# Patient Record
Sex: FEMALE | Race: BLACK OR AFRICAN AMERICAN | Employment: UNEMPLOYED | URBAN - METROPOLITAN AREA
[De-identification: names, ages, dates, MRNs, and addresses within clinical notes are randomized per-mention and may not be internally consistent; named-entity substitution may affect disease eponyms.]

---

## 2018-10-16 ENCOUNTER — APPOINTMENT (OUTPATIENT)
Dept: GENERAL RADIOLOGY | Facility: HOSPITAL | Age: 48
End: 2018-10-16
Attending: EMERGENCY MEDICINE
Payer: MEDICAID

## 2018-10-16 ENCOUNTER — APPOINTMENT (OUTPATIENT)
Dept: ULTRASOUND IMAGING | Facility: HOSPITAL | Age: 48
End: 2018-10-16
Attending: EMERGENCY MEDICINE
Payer: MEDICAID

## 2018-10-16 ENCOUNTER — APPOINTMENT (OUTPATIENT)
Dept: CT IMAGING | Facility: HOSPITAL | Age: 48
End: 2018-10-16
Attending: EMERGENCY MEDICINE
Payer: MEDICAID

## 2018-10-16 ENCOUNTER — HOSPITAL ENCOUNTER (OUTPATIENT)
Facility: HOSPITAL | Age: 48
Setting detail: OBSERVATION
Discharge: HOME | End: 2018-10-18
Attending: EMERGENCY MEDICINE | Admitting: HOSPITALIST
Payer: MEDICAID

## 2018-10-16 DIAGNOSIS — R07.9 CHEST PAIN OF UNCERTAIN ETIOLOGY: Primary | ICD-10-CM

## 2018-10-16 DIAGNOSIS — R06.00 DYSPNEA, UNSPECIFIED TYPE: ICD-10-CM

## 2018-10-16 DIAGNOSIS — D64.9 SYMPTOMATIC ANEMIA: ICD-10-CM

## 2018-10-16 PROCEDURE — 71275 CT ANGIOGRAPHY CHEST: CPT | Performed by: EMERGENCY MEDICINE

## 2018-10-16 PROCEDURE — 93970 EXTREMITY STUDY: CPT | Performed by: EMERGENCY MEDICINE

## 2018-10-16 PROCEDURE — 99220 INITIAL OBSERVATION CARE,LEVL III: CPT | Performed by: INTERNAL MEDICINE

## 2018-10-16 PROCEDURE — 71046 X-RAY EXAM CHEST 2 VIEWS: CPT | Performed by: EMERGENCY MEDICINE

## 2018-10-16 RX ORDER — TRIAMTERENE AND HYDROCHLOROTHIAZIDE 37.5; 25 MG/1; MG/1
1 CAPSULE ORAL EVERY MORNING
Status: DISCONTINUED | OUTPATIENT
Start: 2018-10-17 | End: 2018-10-18

## 2018-10-16 RX ORDER — ACETAMINOPHEN 325 MG/1
650 TABLET ORAL EVERY 6 HOURS PRN
Status: DISCONTINUED | OUTPATIENT
Start: 2018-10-16 | End: 2018-10-18

## 2018-10-16 RX ORDER — POTASSIUM CHLORIDE 20 MEQ/1
40 TABLET, EXTENDED RELEASE ORAL ONCE
Status: COMPLETED | OUTPATIENT
Start: 2018-10-16 | End: 2018-10-17

## 2018-10-16 RX ORDER — TRIAMTERENE AND HYDROCHLOROTHIAZIDE 37.5; 25 MG/1; MG/1
1 CAPSULE ORAL EVERY MORNING
COMMUNITY

## 2018-10-16 RX ORDER — MELATONIN
325 2 TIMES DAILY WITH MEALS
Status: DISCONTINUED | OUTPATIENT
Start: 2018-10-17 | End: 2018-10-17

## 2018-10-16 RX ORDER — IPRATROPIUM BROMIDE AND ALBUTEROL SULFATE 2.5; .5 MG/3ML; MG/3ML
3 SOLUTION RESPIRATORY (INHALATION) EVERY 4 HOURS PRN
Status: DISCONTINUED | OUTPATIENT
Start: 2018-10-16 | End: 2018-10-18

## 2018-10-16 RX ORDER — ASPIRIN 81 MG/1
324 TABLET, CHEWABLE ORAL ONCE
Status: COMPLETED | OUTPATIENT
Start: 2018-10-16 | End: 2018-10-16

## 2018-10-16 RX ORDER — KETOROLAC TROMETHAMINE 30 MG/ML
30 INJECTION, SOLUTION INTRAMUSCULAR; INTRAVENOUS ONCE
Status: COMPLETED | OUTPATIENT
Start: 2018-10-16 | End: 2018-10-16

## 2018-10-16 RX ORDER — LOSARTAN POTASSIUM 50 MG/1
50 TABLET ORAL DAILY
Status: DISCONTINUED | OUTPATIENT
Start: 2018-10-17 | End: 2018-10-18

## 2018-10-16 RX ORDER — ONDANSETRON 2 MG/ML
4 INJECTION INTRAMUSCULAR; INTRAVENOUS EVERY 4 HOURS PRN
Status: DISCONTINUED | OUTPATIENT
Start: 2018-10-16 | End: 2018-10-18

## 2018-10-17 ENCOUNTER — APPOINTMENT (OUTPATIENT)
Dept: ULTRASOUND IMAGING | Facility: HOSPITAL | Age: 48
End: 2018-10-17
Attending: INTERNAL MEDICINE
Payer: MEDICAID

## 2018-10-17 ENCOUNTER — APPOINTMENT (OUTPATIENT)
Dept: CV DIAGNOSTICS | Facility: HOSPITAL | Age: 48
End: 2018-10-17
Attending: INTERNAL MEDICINE
Payer: MEDICAID

## 2018-10-17 PROCEDURE — 30283B1 TRANSFUSION OF NONAUTOLOGOUS 4-FACTOR PROTHROMBIN COMPLEX CONCENTRATE INTO VEIN, PERCUTANEOUS APPROACH: ICD-10-PCS | Performed by: EMERGENCY MEDICINE

## 2018-10-17 PROCEDURE — 76830 TRANSVAGINAL US NON-OB: CPT | Performed by: INTERNAL MEDICINE

## 2018-10-17 PROCEDURE — 93306 TTE W/DOPPLER COMPLETE: CPT | Performed by: INTERNAL MEDICINE

## 2018-10-17 PROCEDURE — 99225 SUBSEQUENT OBSERVATION CARE: CPT | Performed by: INTERNAL MEDICINE

## 2018-10-17 PROCEDURE — 76856 US EXAM PELVIC COMPLETE: CPT | Performed by: INTERNAL MEDICINE

## 2018-10-17 NOTE — PLAN OF CARE
NURSING ADMISSION NOTE      Patient admitted via Cart  Oriented to room. Safety precautions initiated. Bed in low position. Call light in reach. Pt is A&Ox4. Denies pain. On room air. NSR on tele. BP stable. Blood started and tolerating well.  Pt

## 2018-10-17 NOTE — DISCHARGE SUMMARY
[facility] HOSPITALIST  DISCHARGE SUMMARY     Elena Reece Patient Status:  Observation    1970 MRN [id number]   [facility] ([place], [place]) 8NE-A Attending Judy Guerrero MD   Hosp Day # 0 CAMRYN Wheeler     Date of Admission: 10/16/2018  Date o sulfate 325 (65 FE) MG Tbec      Take 1 tablet by mouth 2 (two) times daily with meals. Quantity:  60 tablet  Refills:  0     Losartan Potassium 50 MG Tabs  Commonly known as:  COZAAR      Take 50 mg by mouth daily.    Refills:  0     TraMADol HCl 50 MG T

## 2018-10-17 NOTE — PROGRESS NOTES
HEMALATHA HOSPITALIST  Progress Note     [facility] Patient Status:  Observation    1970 MRN [id number]   [facility] 8NE-A Attending Kayce López MD   Hosp Day # 0 PCP ANTOLIN LAU     Chief Complaint: chest pain, SOB    S: capsule Oral QAM       ASSESSMENT / PLAN:     1. Symptomatic anemia   1. Tolerated transfusion  2. H&H improved  3. Iron, ferritin, B12, folate   4. US pelvis pending  5. Pulse ox, tele   6. ECHO pending  2. Chest pain   1. Tele   2. Echo   3.  CTA chest ne

## 2018-10-17 NOTE — H&P
HEMAL[facility] HOSPITALIST  History and Physical     Dann Reece Patient Status:  Emergency    1970 MRN [id number]   Location [address] [facility] Attending Lore López MD   Hosp Day # 0 PCP ANTOLIN LAU     Chief Complaint: CP ferrous sulfate 325 (65 FE) MG Oral Tab EC Take 1 tablet by mouth 2 (two) times daily with meals. Disp: 60 tablet Rfl: 0   TraMADol HCl (ULTRAM) 50 MG Oral Tab Take 50 mg by mouth every 6 (six) hours as needed.  Disp:  Rfl:        Review of Systems:   CHARLINE briggs am   3. Iron, ferritin, B12, folate   4. US pelvis   5. Pulse ox, tele   2. Chest pain   1. Tele   2. Echo   3. CTA chest   4. US legs neg for DVT  3. Menorrhagia   1. Management per #1  2. Will need Gyn as outpt   4. Essential HTN   1.  Resume home meds  5

## 2018-10-17 NOTE — ED PROVIDER NOTES
Patient Seen in: [facility] Emergency Department    History   Patient presents with:  Dyspnea PAULETTE SOB (respiratory)    Stated Complaint: dyspnea.  pain with deep breathing since yesterday    HPI    This is a a53-year-old female who arrives here with c patient is in no respiratory distress  The patient is in no respiratory distress. The patient is not septic or toxic    HEENT: Atraumatic, conjunctiva are not pale. There is no icterus. Oral mucosa Is wet. No facial trauma. The neck is supple.     LUNGS from clinical presentation.    RBC MORPHOLOGY SCAN - Abnormal; Notable for the following components:    RBC Morphology See morphology below (*)     Hypochromia Small (*)     Microcytosis Small (*)     Ovalocytes Moderate (*)     All other components within rate rhythm axis and intervals I agree with the EKG report .  The rate is 85    Admission disposition: 10/16/2018 10:11 PM         Xr Chest Pa + Lat Chest (cpt=71046)    Result Date: 10/16/2018  PROCEDURE:  XR CHEST PA + LAT CHEST (CPT=71046)  INDICATIONS: adenopathy or mass. STEF:  No mass or adenopathy. CARDIAC:  No enlargement, pericardial thickening, or significant calcification. PLEURA:  No mass or effusion. CHEST WALL:  No mass or axillary adenopathy.   LIMITED ABDOMEN:  Sequelae of gastric bypass is this the patient will need to be admitted. She was given blood. She was ordered blood. The patient case was discussed with Dr. Damir Resendiz the patient will be admitted for further evaluation treatment.   Some of the pain may be chest wall and muscular s

## 2018-10-18 VITALS
SYSTOLIC BLOOD PRESSURE: 153 MMHG | DIASTOLIC BLOOD PRESSURE: 69 MMHG | BODY MASS INDEX: 46.79 KG/M2 | TEMPERATURE: 99 F | WEIGHT: 274.06 LBS | RESPIRATION RATE: 18 BRPM | HEART RATE: 74 BPM | OXYGEN SATURATION: 97 % | HEIGHT: 64 IN

## 2018-10-18 PROBLEM — N92.0 MENORRHAGIA WITH REGULAR CYCLE: Status: ACTIVE | Noted: 2018-10-18

## 2018-10-18 PROCEDURE — 99217 OBSERVATION CARE DISCHARGE: CPT | Performed by: INTERNAL MEDICINE

## 2018-10-18 NOTE — PAYOR COMM NOTE
--------------  ADMISSION REVIEW     Payor: Pedro Jenkins #:  [id number]  Authorization Number: N/A    Admit date: N/A  Admit time: N/A     OBSERVATION STATUS    Admitting Physician: Juan Quigley MD  Attending Physician:  Melba Brown Abnormal; Notable for the following components:       Result Value    Globulin  4.5 (*)     A/G Ratio 0.8 (*)     All other components within normal limits   D-DIMER - Abnormal; Notable for the following components:    D-Dimer 0.57 (*)     All other compon HOSPITALIST  History and Physical     Ceci Reece Patient Status:  Emergency    1970 MRN [id number]   Location [address] [facility] Attending Miesha Shin MD   Hosp Day # 0 PCP ANTOLIN LAU     Chief Complaint: CP/SOB    H 71.1*   PLT  283.0  248. 0      Plan of care: await pelvic US, Gyn eval, possible DC later today    NURSIN received phone call from pt's RN, Margorie Cabot Dr. Windle Jeans notified Arash Humphreys that she would not be able to consult pt d/t insurance, stated ER on-

## 2018-10-18 NOTE — DISCHARGE PLANNING
NURSING DISCHARGE NOTE    Discharged Home via Wheelchair. Accompanied by Support staff  Belongings Taken by patient/family. Patient given discharge instructions at the bedside with the opportunity to ask questions as needed.  Educated patient on how

## 2018-10-18 NOTE — CM/SW NOTE
AV met with the patient to discuss choosing an OB GYN who accepts her insurance. Information has already been provided to her in print out form instructing her to call the number on her insurance card to find a list of providers.   She was grateful for th

## 2018-10-18 NOTE — CM/SW NOTE
Patient is out of network with insurance. [facility] in January 2019.  The pt was provided with a copy of the letter which contains a phone number to call for support and physician referral. They arleenu

## 2018-10-18 NOTE — PLAN OF CARE
2115 received phone call from pt's RN, Jesús - Dr. Aniyah Al notified Jesús that she would not be able to consult pt d/t insurance, stated ER on-call would be able to consult.  This RN notified Dr. Deyanira Darling regarding the change in consult and need to delay discharg

## 2018-10-19 ENCOUNTER — APPOINTMENT (OUTPATIENT)
Dept: GENERAL RADIOLOGY | Facility: HOSPITAL | Age: 48
End: 2018-10-19
Attending: EMERGENCY MEDICINE
Payer: MEDICAID

## 2018-10-19 ENCOUNTER — HOSPITAL ENCOUNTER (EMERGENCY)
Facility: HOSPITAL | Age: 48
Discharge: HOME | End: 2018-10-19
Attending: EMERGENCY MEDICINE
Payer: MEDICAID

## 2018-10-19 VITALS
HEIGHT: 64 IN | HEART RATE: 83 BPM | SYSTOLIC BLOOD PRESSURE: 145 MMHG | OXYGEN SATURATION: 100 % | RESPIRATION RATE: 18 BRPM | WEIGHT: 274 LBS | DIASTOLIC BLOOD PRESSURE: 70 MMHG | TEMPERATURE: 98 F | BODY MASS INDEX: 46.78 KG/M2

## 2018-10-19 DIAGNOSIS — R07.89 CHEST WALL PAIN: Primary | ICD-10-CM

## 2018-10-19 PROCEDURE — 85025 COMPLETE CBC W/AUTO DIFF WBC: CPT | Performed by: EMERGENCY MEDICINE

## 2018-10-19 PROCEDURE — 36415 COLL VENOUS BLD VENIPUNCTURE: CPT

## 2018-10-19 PROCEDURE — 99285 EMERGENCY DEPT VISIT HI MDM: CPT

## 2018-10-19 PROCEDURE — 93010 ELECTROCARDIOGRAM REPORT: CPT

## 2018-10-19 PROCEDURE — 84484 ASSAY OF TROPONIN QUANT: CPT | Performed by: EMERGENCY MEDICINE

## 2018-10-19 PROCEDURE — 71045 X-RAY EXAM CHEST 1 VIEW: CPT | Performed by: EMERGENCY MEDICINE

## 2018-10-19 PROCEDURE — 93005 ELECTROCARDIOGRAM TRACING: CPT

## 2018-10-19 PROCEDURE — 80053 COMPREHEN METABOLIC PANEL: CPT | Performed by: EMERGENCY MEDICINE

## 2018-10-19 RX ORDER — NAPROXEN 500 MG/1
500 TABLET ORAL 2 TIMES DAILY PRN
Qty: 20 TABLET | Refills: 0 | Status: SHIPPED | OUTPATIENT
Start: 2018-10-19 | End: 2018-10-26

## 2018-10-20 NOTE — ED PROVIDER NOTES
Patient Seen in: [facility] Emergency Department    History   Patient presents with:  Chest Pain Angina (cardiovascular)    Stated Complaint: chest pain    HPI    44-year-old female comes the hospital complaint of having difficulty with sharp chest cm (5' 4\")   Wt 124.3 kg   LMP 10/01/2018   SpO2 100%   BMI 47.03 kg/m²         Physical Exam    HEENT : NCAT, EOMI, PEERL, throat clear, neck supple, no JVD, trachea midline, No LAD  Heart: S1S2 normal. No murmurs, regular rate and rhythm, chest wall ten 88  Rhythm: Sinus Rhythm  Reading: Agreed              Xr Chest Pa + Lat Chest (cpt=71046)    Result Date: 10/16/2018  PROCEDURE:  XR CHEST PA + LAT CHEST (CPT=71046)  INDICATIONS:  dyspnea.  pain with deep breathing since yesterday  COMPARISON:  HEMALATHA X pericardial thickening, or significant calcification. PLEURA:  No mass or effusion. CHEST WALL:  No mass or axillary adenopathy. LIMITED ABDOMEN:  Sequelae of gastric bypass is noted. BONES:  No bony lesion or fracture. OTHER:  Negative.        [facility] demonstrate degenerative changes of the spine. CONCLUSION:  No acute disease.       Dictated by: Chantal Rojas MD on 10/19/2018 at 20:58     Approved by: Chantal Rojas MD            Medications - No data to display        MDM   Patient was observ

## 2018-10-20 NOTE — ED INITIAL ASSESSMENT (HPI)
Pt presents to ED with complaint of chest pain. Pt reports recently admitted for same and states it was related to low hemoglobin.  Reports receiving blood transfusion when she was here

## 2018-10-22 NOTE — PAYOR COMM NOTE
--------------  DISCHARGE REVIEW    Payor: Pedro Jenkins #:  [id number]  Authorization Number: [id number]    Admit date: N/A  Admit time:  N/A  Discharge Date: 10/18/2018 11:24 AM     Admitting Physician: Jaya Montero MD  Attending

## 2019-05-28 ENCOUNTER — HOSPITAL ENCOUNTER (EMERGENCY)
Facility: HOSPITAL | Age: 49
Discharge: HOME | End: 2019-05-29
Attending: EMERGENCY MEDICINE
Payer: MEDICAID

## 2019-05-28 ENCOUNTER — APPOINTMENT (OUTPATIENT)
Dept: ULTRASOUND IMAGING | Facility: HOSPITAL | Age: 49
End: 2019-05-28
Attending: EMERGENCY MEDICINE
Payer: MEDICAID

## 2019-05-28 VITALS
OXYGEN SATURATION: 98 % | SYSTOLIC BLOOD PRESSURE: 142 MMHG | BODY MASS INDEX: 49.51 KG/M2 | WEIGHT: 290 LBS | DIASTOLIC BLOOD PRESSURE: 70 MMHG | HEIGHT: 64 IN | HEART RATE: 76 BPM | RESPIRATION RATE: 18 BRPM | TEMPERATURE: 98 F

## 2019-05-28 DIAGNOSIS — M71.21 BAKER'S CYST OF KNEE, RIGHT: ICD-10-CM

## 2019-05-28 DIAGNOSIS — M54.32 SCIATICA OF LEFT SIDE: Primary | ICD-10-CM

## 2019-05-28 PROCEDURE — 99284 EMERGENCY DEPT VISIT MOD MDM: CPT

## 2019-05-28 PROCEDURE — 93970 EXTREMITY STUDY: CPT | Performed by: EMERGENCY MEDICINE

## 2019-05-28 RX ORDER — CYCLOBENZAPRINE HCL 10 MG
10 TABLET ORAL 3 TIMES DAILY PRN
Qty: 20 TABLET | Refills: 0 | Status: SHIPPED | OUTPATIENT
Start: 2019-05-28 | End: 2019-06-04

## 2019-05-28 RX ORDER — ACETAMINOPHEN 500 MG
1000 TABLET ORAL EVERY 6 HOURS PRN
COMMUNITY

## 2019-05-28 RX ORDER — HYDROCODONE BITARTRATE AND ACETAMINOPHEN 5; 325 MG/1; MG/1
2 TABLET ORAL ONCE
Status: COMPLETED | OUTPATIENT
Start: 2019-05-28 | End: 2019-05-28

## 2019-05-28 RX ORDER — METHYLPREDNISOLONE 4 MG/1
TABLET ORAL
Qty: 1 PACKAGE | Refills: 0 | Status: SHIPPED | OUTPATIENT
Start: 2019-05-28 | End: 2019-06-02

## 2019-05-29 NOTE — ED PROVIDER NOTES
Patient Seen in: [facility] Emergency Department    History   Patient presents with:  Swelling Edema (cardiovascular, metabolic)    Stated Complaint: swelling and pain to both legs from IC    HPI  Patient is a 46-year female who states that for the resting comfortably on the cart in no acute distress. HEENT: Extraocular muscles intact, pupils equal round reactive to light and accommodation. Mouth normal, neck supple, no meningismus. LUNGS: Lungs clear to auscultation bilaterally.   CARDIOVASCULAR: (10 mg total) by mouth 3 (three) times daily as needed for Muscle spasms.   Qty: 20 tablet Refills: 0

## 2019-05-29 NOTE — ED NOTES
PT return from [address] without complications.

## 2019-06-06 ENCOUNTER — HOSPITAL ENCOUNTER (EMERGENCY)
Facility: HOSPITAL | Age: 49
Discharge: HOME | End: 2019-06-06
Attending: EMERGENCY MEDICINE
Payer: MEDICAID

## 2019-06-06 ENCOUNTER — APPOINTMENT (OUTPATIENT)
Dept: ULTRASOUND IMAGING | Facility: HOSPITAL | Age: 49
End: 2019-06-06
Attending: PHYSICIAN ASSISTANT
Payer: MEDICAID

## 2019-06-06 VITALS
BODY MASS INDEX: 49.51 KG/M2 | RESPIRATION RATE: 15 BRPM | HEIGHT: 64 IN | DIASTOLIC BLOOD PRESSURE: 66 MMHG | HEART RATE: 97 BPM | WEIGHT: 290 LBS | SYSTOLIC BLOOD PRESSURE: 123 MMHG | OXYGEN SATURATION: 95 % | TEMPERATURE: 98 F

## 2019-06-06 DIAGNOSIS — N93.8 DYSFUNCTIONAL UTERINE BLEEDING: Primary | ICD-10-CM

## 2019-06-06 DIAGNOSIS — D25.9 UTERINE LEIOMYOMA, UNSPECIFIED LOCATION: ICD-10-CM

## 2019-06-06 PROCEDURE — 76856 US EXAM PELVIC COMPLETE: CPT | Performed by: PHYSICIAN ASSISTANT

## 2019-06-06 PROCEDURE — 83690 ASSAY OF LIPASE: CPT | Performed by: PHYSICIAN ASSISTANT

## 2019-06-06 PROCEDURE — 76830 TRANSVAGINAL US NON-OB: CPT | Performed by: PHYSICIAN ASSISTANT

## 2019-06-06 PROCEDURE — 86850 RBC ANTIBODY SCREEN: CPT | Performed by: PHYSICIAN ASSISTANT

## 2019-06-06 PROCEDURE — 85610 PROTHROMBIN TIME: CPT | Performed by: PHYSICIAN ASSISTANT

## 2019-06-06 PROCEDURE — 93010 ELECTROCARDIOGRAM REPORT: CPT

## 2019-06-06 PROCEDURE — 86870 RBC ANTIBODY IDENTIFICATION: CPT | Performed by: PHYSICIAN ASSISTANT

## 2019-06-06 PROCEDURE — 99285 EMERGENCY DEPT VISIT HI MDM: CPT

## 2019-06-06 PROCEDURE — 80053 COMPREHEN METABOLIC PANEL: CPT | Performed by: PHYSICIAN ASSISTANT

## 2019-06-06 PROCEDURE — 86900 BLOOD TYPING SEROLOGIC ABO: CPT | Performed by: PHYSICIAN ASSISTANT

## 2019-06-06 PROCEDURE — 93005 ELECTROCARDIOGRAM TRACING: CPT

## 2019-06-06 PROCEDURE — 96360 HYDRATION IV INFUSION INIT: CPT

## 2019-06-06 PROCEDURE — 86860 RBC ANTIBODY ELUTION: CPT | Performed by: PHYSICIAN ASSISTANT

## 2019-06-06 PROCEDURE — 85025 COMPLETE CBC W/AUTO DIFF WBC: CPT | Performed by: PHYSICIAN ASSISTANT

## 2019-06-06 PROCEDURE — 86901 BLOOD TYPING SEROLOGIC RH(D): CPT | Performed by: PHYSICIAN ASSISTANT

## 2019-06-06 RX ORDER — ONDANSETRON 4 MG/1
4 TABLET, ORALLY DISINTEGRATING ORAL EVERY 4 HOURS PRN
Qty: 10 TABLET | Refills: 0 | Status: SHIPPED | OUTPATIENT
Start: 2019-06-06 | End: 2019-06-13

## 2019-06-06 NOTE — ED NOTES
Received patient from [facility] at this time. Patient remains on continuous cardiac monitoring and pulse oximetry. Denies any need or complaint at this time. Will continue to monitor.

## 2019-06-06 NOTE — ED PROVIDER NOTES
Patient Seen in: [facility] Emergency Department    History   Patient presents with:  Eval-G (genital)  Dyspnea PAULETTE SOB (respiratory)  Dizziness (neurologic)    Stated Complaint: sob    HPI    Patient is a 15-year-old female.   She has a medical hist Conjunctiva WNL. No obvious discharge or crusting  ENT: Bilateral auditory canals demonstrate no erythema or bulging of the TMs. Nasal mucosa and turbinates WNL. Oropharynx is moist without exudate, deviation or stridor to auscultation.    Neck: Supple, No DIFFERENTIAL[[id number]]          Abnormal            Final result                 Please view results for these tests on the individual orders. TYPE AND SCREEN    Narrative: The following orders were created for panel order TYPE AND SCREEN.   Procedur CUL-DE-SAC:  Normal.  No fluid or mass. OTHER:  Negative. CONCLUSION:  Multiple uterine fibroids. The left fundal fibroid was not identified on the previous examination.    Dictated by: Kaya Molina MD on 6/06/2019 at 19:26     Approved by: Emmanuel Gamez

## (undated) NOTE — ED AVS SNAPSHOT
Jessy Bedoya   MRN: [id number]    Department:  [facility] Emergency Department   Date of Visit:  10/19/2018           Disclosure     Insurance plans vary and the physician(s) referred by the ER may not be covered by your plan.  Please contact tell this physician (or your personal doctor if your instructions are to return to your personal doctor) about any new or lasting problems. The primary care or specialist physician will see patients referred from the [facility] Emergency Department.  Anant Taveras

## (undated) NOTE — ED AVS SNAPSHOT
Durga De Anda   MRN: [id number]    Department:  [facility] Emergency Department   Date of Visit:  6/6/2019           Disclosure     Insurance plans vary and the physician(s) referred by the ER may not be covered by your plan.  Please contact y tell this physician (or your personal doctor if your instructions are to return to your personal doctor) about any new or lasting problems. The primary care or specialist physician will see patients referred from the [facility] Emergency Department.  Lili Rondon

## (undated) NOTE — LETTER
[address] [facility] FOR BLOOD OR BLOOD COMPONENTS      In the course of your treatment, it may become necessary to administer a transfusion of blood or blood components.  This form provides basic information concerning this proc explain the alternatives to you if it has not already been done. I,Selwyn Reece, have read/had read to me the above. I understand the matters bearing on the decision whether or not to authorize a transfusion of blood or blood components.  I have no q

## (undated) NOTE — ED AVS SNAPSHOT
Mickey Kruse   MRN: [id number]    Department:  [facility] Emergency Department   Date of Visit:  5/28/2019           Disclosure     Insurance plans vary and the physician(s) referred by the ER may not be covered by your plan.  Please contact tell this physician (or your personal doctor if your instructions are to return to your personal doctor) about any new or lasting problems. The primary care or specialist physician will see patients referred from the [facility] Emergency Department.  Terrance Romano

## (undated) NOTE — LETTER
Date: 10/18/2018    Patient Name: Cole Reece          To Whom it may concern: This letter has been written at the patient's request. The above patient was seen at the [facility] for treatment of a medical condition.     This patient